# Patient Record
Sex: FEMALE | Race: WHITE
[De-identification: names, ages, dates, MRNs, and addresses within clinical notes are randomized per-mention and may not be internally consistent; named-entity substitution may affect disease eponyms.]

---

## 2022-05-14 ENCOUNTER — HOSPITAL ENCOUNTER (EMERGENCY)
Dept: HOSPITAL 76 - ED | Age: 42
Discharge: HOME | End: 2022-05-14
Payer: MEDICAID

## 2022-05-14 VITALS — SYSTOLIC BLOOD PRESSURE: 109 MMHG | DIASTOLIC BLOOD PRESSURE: 58 MMHG
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
## 2022-05-14 DIAGNOSIS — Z23: ICD-10-CM

## 2022-05-14 DIAGNOSIS — S61.209A: Primary | ICD-10-CM
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
## 2022-05-14 DIAGNOSIS — Z71.85: ICD-10-CM

## 2022-05-14 PROCEDURE — 99282 EMERGENCY DEPT VISIT SF MDM: CPT

## 2022-05-14 PROCEDURE — 90715 TDAP VACCINE 7 YRS/> IM: CPT

## 2022-05-14 PROCEDURE — 1040M: CPT

## 2022-05-14 PROCEDURE — 90471 IMMUNIZATION ADMIN: CPT

## 2022-05-14 PROCEDURE — 99283 EMERGENCY DEPT VISIT LOW MDM: CPT

## 2022-05-14 NOTE — ED PHYSICIAN DOCUMENTATION
History of Present Illness





- Stated complaint


Stated Complaint: LT HAND FINGER LAC





- Chief complaint


Chief Complaint: Laceration





- Additonal information


Additional information: 





42-year-old female presents emergency department for evaluation of a laceration 

on the radial side distal tip left index finger sustained last night at work 

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  Uncertain of last tetanus status.  She has had a 

difficult time getting the wound to stop bleeding. Right-hand-dominant





Review of Systems


Constitutional: reports: Reviewed and negative


Cardiac: reports: Reviewed and negative


Respiratory: reports: Reviewed and negative


GI: reports: Reviewed and negative


Skin: reports: Laceration (s)





PD PAST MEDICAL HISTORY





- Present Medications


Home Medications: 


                                Ambulatory Orders











 Medication  Instructions  Recorded  Confirmed


 


No Known Home Medications  05/14/22 05/14/22














- Allergies


Allergies/Adverse Reactions: 


                                    Allergies











Allergy/AdvReac Type Severity Reaction Status Date / Time


 


cephalexin [From Keflex] Allergy  Hives Verified 05/14/22 17:44


 


erythromycin base AdvReac  Nausea Verified 05/14/22 17:44














PD ED PE EXPANDED





- Extremities


Extremities: Left finger(s) (Linear skin avulsion distal tip radial side left 

index finger not amenable to primary closure. Normal flexion extension at DIP.  

Sensation preserved.)





Results





- Vitals


Vitals: 





                               Vital Signs - 24 hr











  05/14/22





  17:41


 


Temperature 36.4 C L


 


Heart Rate 64


 


Respiratory 16





Rate 


 


Blood Pressure 109/58 L


 


O2 Saturation 100














PD MEDICAL DECISION MAKING





- ED course


Complexity details: considered differential, d/w patient


ED course: 





42-year-old female presents emergency department for evaluation of an avulsion 

laceration to the distal tip of her left index finger.  This was a flap 

laceration where the flap is now missing.  Not amenable to primary closure.  

Wound was thoroughly cleansed with saline.  Gelfoam applied to the wound base 

and sterile gauze.  Discussed routine wound care.  Tetanus was updated today.





[employer] claim number [id number]





Departure





- Departure


Disposition: 01 Home, Self Care


Clinical Impression: 


Avulsion of skin of finger


Qualifiers:


 Encounter type: initial encounter Qualified Code(s): S61.209A - Unspecified 

open wound of unspecified finger without damage to nail, initial encounter





Condition: Stable


Record reviewed to determine appropriate education?: Yes


Comments: 


Merry the laceration to your skin is an avulsion.  This is simply a deeper 

abrasion.  This is not something that we are able to close with sutures.  We did

place a product on the wound called Gelfoam.  This will adhere to the wound bed 

allow the clot to form.  It will simply fall away over the next 4 to 5 days.  

Your tetanus was updated and should be good for the next 7 to 10 years.





This wound will heal over the next 1 to 2 weeks from inside out.  If at any 

point you have concerns of infection, fevers, redness milky drainage finger 

swelling or increased pain then please return to the ER for second evaluation.

## 2023-01-04 ENCOUNTER — HOSPITAL ENCOUNTER (EMERGENCY)
Dept: HOSPITAL 76 - ED | Age: 43
Discharge: HOME | End: 2023-01-04
Payer: COMMERCIAL

## 2023-01-04 VITALS — SYSTOLIC BLOOD PRESSURE: 122 MMHG | DIASTOLIC BLOOD PRESSURE: 90 MMHG

## 2023-01-04 DIAGNOSIS — R51.9: ICD-10-CM

## 2023-01-04 DIAGNOSIS — R20.2: ICD-10-CM
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
## 2023-01-04 DIAGNOSIS — S09.90XA: Primary | ICD-10-CM

## 2023-01-04 LAB
ALBUMIN DIAFP-MCNC: 3.9 G/DL (ref 3.2–5.5)
ALBUMIN/GLOB SERPL: 1.1 {RATIO} (ref 1–2.2)
ALP SERPL-CCNC: 57 IU/L (ref 42–121)
ALT SERPL W P-5'-P-CCNC: 25 IU/L (ref 10–60)
ANION GAP SERPL CALCULATED.4IONS-SCNC: 8 MMOL/L (ref 6–13)
AST SERPL W P-5'-P-CCNC: 25 IU/L (ref 10–42)
BASOPHILS NFR BLD AUTO: 0 10^3/UL (ref 0–0.1)
BASOPHILS NFR BLD AUTO: 0.5 %
BILIRUB BLD-MCNC: 0.5 MG/DL (ref 0.2–1)
BUN SERPL-MCNC: 9 MG/DL (ref 6–20)
CALCIUM UR-MCNC: 9.2 MG/DL (ref 8.5–10.3)
CHLORIDE SERPL-SCNC: 102 MMOL/L (ref 101–111)
CO2 SERPL-SCNC: 27 MMOL/L (ref 21–32)
CREAT SERPLBLD-SCNC: 0.7 MG/DL (ref 0.4–1)
EOSINOPHIL # BLD AUTO: 0.1 10^3/UL (ref 0–0.7)
EOSINOPHIL NFR BLD AUTO: 1 %
ERYTHROCYTE [DISTWIDTH] IN BLOOD BY AUTOMATED COUNT: 12.2 % (ref 12–15)
GFRSERPLBLD MDRD-ARVRAT: 93 ML/MIN/{1.73_M2} (ref 89–?)
GLOBULIN SER-MCNC: 3.5 G/DL (ref 2.1–4.2)
GLUCOSE SERPL-MCNC: 103 MG/DL (ref 70–100)
HCT VFR BLD AUTO: 41 % (ref 37–47)
HGB UR QL STRIP: 13.7 G/DL (ref 12–16)
LYMPHOCYTES # SPEC AUTO: 1.7 10^3/UL (ref 1.5–3.5)
LYMPHOCYTES NFR BLD AUTO: 19.7 %
MCH RBC QN AUTO: 29 PG (ref 27–31)
MCHC RBC AUTO-ENTMCNC: 33.4 G/DL (ref 32–36)
MCV RBC AUTO: 86.7 FL (ref 81–99)
MONOCYTES # BLD AUTO: 0.6 10^3/UL (ref 0–1)
MONOCYTES NFR BLD AUTO: 6.5 %
NEUTROPHILS # BLD AUTO: 6.3 10^3/UL (ref 1.5–6.6)
NEUTROPHILS # SNV AUTO: 8.8 X10^3/UL (ref 4.8–10.8)
NEUTROPHILS NFR BLD AUTO: 72.1 %
NRBC # BLD AUTO: 0 /100WBC
NRBC # BLD AUTO: 0 X10^3/UL
PDW BLD AUTO: 9.8 FL (ref 7.9–10.8)
PLATELET # BLD: 371 10^3/UL (ref 130–450)
POTASSIUM SERPL-SCNC: 4.3 MMOL/L (ref 3.5–5)
PROT SPEC-MCNC: 7.4 G/DL (ref 6.7–8.2)
RBC MAR: 4.73 10^6/UL (ref 4.2–5.4)
SODIUM SERPLBLD-SCNC: 137 MMOL/L (ref 135–145)

## 2023-01-04 PROCEDURE — 96374 THER/PROPH/DIAG INJ IV PUSH: CPT

## 2023-01-04 PROCEDURE — 80053 COMPREHEN METABOLIC PANEL: CPT

## 2023-01-04 PROCEDURE — 36415 COLL VENOUS BLD VENIPUNCTURE: CPT

## 2023-01-04 PROCEDURE — 70450 CT HEAD/BRAIN W/O DYE: CPT

## 2023-01-04 PROCEDURE — 99284 EMERGENCY DEPT VISIT MOD MDM: CPT

## 2023-01-04 PROCEDURE — 85025 COMPLETE CBC W/AUTO DIFF WBC: CPT

## 2023-01-04 PROCEDURE — 96375 TX/PRO/DX INJ NEW DRUG ADDON: CPT

## 2023-01-04 NOTE — CT REPORT
PROCEDURE:  HEAD WO

 

INDICATIONS:  head injury/headaches x 2 weeks

 

TECHNIQUE:  

Noncontrast 4.5 mm thick angled axial sections acquired from the foramen magnum to the vertex.  For r
adiation dose reduction, the following was used:  automated exposure control, adjustment of mA and/or
 kV according to patient size.

 

COMPARISON:  None.

 

FINDINGS:  

Image quality:  Excellent.  

 

CSF spaces:  Basal cisterns are patent.  No extra-axial fluid collections.  Ventricles are normal in 
size and shape.  

 

Brain:  No midline shift.  No intracranial masses or hemorrhage.  Gray-white matter interface is norm
al.  

 

Skull and face:  Calvarium and visualized facial bones are intact, without suspicious lesions.  

 

Sinuses:  Visualized sinuses and mastoids are clear.  

 

 

 

 

 

IMPRESSION:  No imaging explanation is found for the patient's presenting symptoms.

 

No intracranial hemorrhage is seen.    

 

No significant intracranial abnormality is seen.  

 

Reviewed by: Cameron Chen MD on 1/4/2023 4:22 PM [facility]

Approved by: Cameron Chen MD on 1/4/2023 4:22 PM [facility]

 

 

Station ID:  [id number]

## 2023-01-04 NOTE — ED PHYSICIAN DOCUMENTATION
PD HPI HEADACHE





- Stated complaint


Stated Complaint: FACIAL NUMBNESS





- Chief complaint


Chief Complaint: Neuro





- History obtained from


History obtained from: Patient





- Additional information


Additional information: 


Patient is a 42-year-old female presenting for evaluation of frontal headaches 

that she has been having for the past 2 weeks since falling and hitting her head

on ice.  She was seen at the walk-in clinic and diagnosed with a concussion.  

She did not have imaging done.  She has been using over-the-counter medications 

with minimal improvement.Additionally today she noticed an area of numbness to 

the left forehead.  She denies weakness.  She denies visual disturbance.  She 

denies numbness elsewhere.  She does report having some mild light sensitivity. 

No nausea or vomiting.  No fever, cough, chest pain or difficulty breathing.  

She denies concerns for pregnancy.








Review of Systems


Constitutional: denies: Fever


Nose: denies: Congestion


Cardiac: denies: Chest pain / pressure


Respiratory: denies: Dyspnea


GI: denies: Abdominal Pain


: denies: Dysuria


Musculoskeletal: denies: Back pain


Neurologic: reports: Headache





PD PAST MEDICAL HISTORY





- Present Medications


Home Medications: 


                                Ambulatory Orders











 Medication  Instructions  Recorded  Confirmed


 


No Known Home Medications  05/14/22 01/04/23














- Allergies


Allergies/Adverse Reactions: 


                                    Allergies











Allergy/AdvReac Type Severity Reaction Status Date / Time


 


cephalexin [From Keflex] Allergy  Hives Verified 01/04/23 14:26


 


erythromycin base AdvReac  Nausea Verified 01/04/23 14:26














PD ED PE NORMAL





- General


General: Alert and oriented X 3, No acute distress, Well developed/nourished





- HEENT


HEENT: Atraumatic, PERRL, EOMI, Moist mucous membranes, Pharynx benign





- Neck


Neck: Supple, no meningeal sign, No bony TTP, C-Spine cleared by NEXUS criteria





- Cardiac


Cardiac: RRR, No murmur





- Respiratory


Respiratory: No respiratory distress, Clear bilaterally





- Abdomen


Abdomen: Soft, Non tender





- Derm


Derm: Warm and dry





- Neuro


Neuro: Alert and oriented X 3, CNs 2-12 intact, No motor deficit, Normal speech,

Other (Normal finger-nose bilaterally, normal unassisted gait).  No: No sensory 

deficit (Mild decrease sensation to left forehead When compared to right,)


Eye Opening: Spontaneous


Motor: Obeys Commands


Verbal: Oriented


GCS Score: 15





Results





- Vitals


Vitals: 


                               Vital Signs - 24 hr











  01/04/23 01/04/23





  14:18 16:55


 


Temperature 36.5 C 


 


Heart Rate 85 71


 


Respiratory 16 16





Rate  


 


Blood Pressure 174/92 H 149/92 H


 


O2 Saturation 100 99








                                     Oxygen











O2 Source                      Room air

















- Labs


Labs: 


                                Laboratory Tests











  01/04/23 01/04/23





  16:24 16:24


 


WBC  8.8 


 


RBC  4.73 


 


Hgb  13.7 


 


Hct  41.0 


 


MCV  86.7 


 


MCH  29.0 


 


MCHC  33.4 


 


RDW  12.2 


 


Plt Count  371 


 


MPV  9.8 


 


Neut # (Auto)  6.3 


 


Lymph # (Auto)  1.7 


 


Mono # (Auto)  0.6 


 


Eos # (Auto)  0.1 


 


Baso # (Auto)  0.0 


 


Absolute Nucleated RBC  0.00 


 


Nucleated RBC %  0.0 


 


Sodium   137


 


Potassium   4.3


 


Chloride   102


 


Carbon Dioxide   27


 


Anion Gap   8.0


 


BUN   9


 


Creatinine   0.7


 


Estimated GFR (MDRD)   93


 


Glucose   103 H


 


Calcium   9.2


 


Total Bilirubin   0.5


 


AST   25


 


ALT   25


 


Alkaline Phosphatase   57


 


Total Protein   7.4


 


Albumin   3.9


 


Globulin   3.5


 


Albumin/Globulin Ratio   1.1














PD Medical Decision Making





- ED course


Complexity details: reviewed results, re-evaluated patient, d/w patient


ED course: 


Patient is a 42-year-old presenting for evaluation of frequent headaches after a

head injury.  She reports mild paresthesia to the left upper face but otherwise 

does not have any deficits noted on exam.  I do not feel this is suggestive of a

ischemic event.  CT of the brain was obtained which is negative for any 

intracranial bleed.  Patient did receive medications for a migraine and is 

feeling better.She is ambulatory and has normal motor function.Patient counseled

on need for close follow-up with her PCP as well as concerning symptoms to 

return for.








Departure





- Departure


Disposition: 01 Home, Self Care


Clinical Impression: 


 Headache, Head injury, Facial paresthesia





Condition: Stable


Instructions:  ED Head Injury Closed, ED Headache Migraine


Comments: 


Your head CT does not show any signs of intracranial bleeding or other 

abnormalities from your head injury.He did receive medications for a migraine.  

I would recommend close follow-up with your primary care doctor.  I would 

continue with rest and hydration.  If you have any worsening symptoms please 

consider return to the ER.

## 2023-06-28 ENCOUNTER — HOSPITAL ENCOUNTER (OUTPATIENT)
Dept: HOSPITAL 76 - LAB.N | Age: 43
Discharge: HOME | End: 2023-06-28
Attending: NURSE PRACTITIONER
Payer: COMMERCIAL

## 2023-06-28 DIAGNOSIS — N39.0: Primary | ICD-10-CM

## 2023-06-28 PROCEDURE — 87181 SC STD AGAR DILUTION PER AGT: CPT

## 2023-06-28 PROCEDURE — 87086 URINE CULTURE/COLONY COUNT: CPT
